# Patient Record
Sex: MALE | Race: WHITE | NOT HISPANIC OR LATINO | URBAN - METROPOLITAN AREA
[De-identification: names, ages, dates, MRNs, and addresses within clinical notes are randomized per-mention and may not be internally consistent; named-entity substitution may affect disease eponyms.]

---
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
## 2020-12-31 PROBLEM — K51.90 ULCERATIVE COLITIS, UNSPECIFIED, WITHOUT COMPLICATIONS: Chronic | Status: ACTIVE | Noted: 2018-08-17

## 2021-01-04 ENCOUNTER — NON-APPOINTMENT (OUTPATIENT)
Age: 21
End: 2021-01-04

## 2021-01-04 ENCOUNTER — APPOINTMENT (OUTPATIENT)
Dept: DERMATOLOGY | Facility: CLINIC | Age: 21
End: 2021-01-04
Payer: COMMERCIAL

## 2021-01-04 VITALS — WEIGHT: 133 LBS | BODY MASS INDEX: 22.16 KG/M2 | HEIGHT: 65 IN

## 2021-01-04 DIAGNOSIS — L30.9 DERMATITIS, UNSPECIFIED: ICD-10-CM

## 2021-01-04 DIAGNOSIS — R23.8 OTHER SKIN CHANGES: ICD-10-CM

## 2021-01-04 PROCEDURE — 99204 OFFICE O/P NEW MOD 45 MIN: CPT | Mod: 25

## 2021-01-04 PROCEDURE — 99072 ADDL SUPL MATRL&STAF TM PHE: CPT

## 2021-01-04 PROCEDURE — 54056 CRYOSURGERY PENIS LESION(S): CPT

## 2021-01-04 RX ORDER — HALOBETASOL PROPIONATE 0.5 MG/G
0.05 OINTMENT TOPICAL
Qty: 1 | Refills: 2 | Status: ACTIVE | COMMUNITY
Start: 2021-01-04 | End: 1900-01-01

## 2021-01-04 RX ORDER — BETAMETHASONE DIPROPIONATE 0.5 MG/G
0.05 OINTMENT, AUGMENTED TOPICAL
Qty: 50 | Refills: 0 | Status: ACTIVE | COMMUNITY
Start: 2020-08-24

## 2021-01-04 RX ORDER — SODIUM PICOSULFATE, MAGNESIUM OXIDE, AND ANHYDROUS CITRIC ACID 10; 3.5; 12 MG/160ML; G/160ML; G/160ML
10-3.5-12 MG-GM LIQUID ORAL
Qty: 320 | Refills: 0 | Status: ACTIVE | COMMUNITY
Start: 2020-12-31

## 2021-01-04 RX ORDER — SODIUM SULFATE, POTASSIUM SULFATE, MAGNESIUM SULFATE 17.5; 3.13; 1.6 G/ML; G/ML; G/ML
17.5-3.13-1.6 SOLUTION, CONCENTRATE ORAL
Qty: 354 | Refills: 0 | Status: ACTIVE | COMMUNITY
Start: 2020-12-31

## 2021-01-04 RX ORDER — CYANOCOBALAMIN 1000 UG/ML
1000 INJECTION INTRAMUSCULAR; SUBCUTANEOUS
Qty: 1 | Refills: 0 | Status: ACTIVE | COMMUNITY
Start: 2020-12-22

## 2021-01-04 RX ORDER — TRIAMCINOLONE ACETONIDE 1 MG/G
0.1 OINTMENT TOPICAL
Qty: 1 | Refills: 1 | Status: ACTIVE | COMMUNITY
Start: 2021-01-04 | End: 1900-01-01

## 2021-02-08 ENCOUNTER — APPOINTMENT (OUTPATIENT)
Dept: DERMATOLOGY | Facility: CLINIC | Age: 21
End: 2021-02-08

## 2021-04-19 NOTE — ED PROVIDER NOTE - CONDUCTED A DETAILED DISCUSSION WITH PATIENT AND/OR GUARDIAN REGARDING, MDM
Physician Progress Note      Johnnie Lemus  CSN #:                  [id number]  :                       1953  ADMIT DATE:       2021 12:18 PM  [address] [place] DATE:  RESPONDING  PROVIDER #:        Gary Garcia MD          QUERY TEXT:    Patient admitted with Common bile duct stone with obstruction noted to have   2/4 SIRS Tmax 39.5, WBC 12.5. If possible, please document in progress notes   and discharge summary if you are evaluating and/or treating any of the   following: The medical record reflects the following:  Risk Factors: CBD obstruction  Clinical Indicators: CT scan at outlying facility: CT of the abdomen done at   [facility] showed evidence of high-grade obstruction of the right distal   common bile duct which could be related to stone diabetes or blood clot, there   was moderate intrahepatic biliary dilatation and dilatation of pancreatic   duct. elevated liver enzymes: Alk phosphate 188, ,   Treatment: GI evaluation, plan for ERCP, NPO, IVF  Options provided:  -- SIRS of non-infectious origin due to common bile duct stone with   obstruction without acute organ dysfunction  -- Other - I will add my own diagnosis  -- Disagree - Not applicable / Not valid  -- Disagree - Clinically unable to determine / Unknown  -- Refer to Clinical Documentation Reviewer    PROVIDER RESPONSE TEXT:    This patient has SIRS of non-infectious origin due to CBD stone with   obstruction without acute organ dysfunction.     Query created by: Michelle Luis on 2021 3:20 PM      Electronically signed by:  Gary Garcia MD 2021 5:01 PM return to ED if symptoms worsen, persist or questions arise/need for outpatient follow-up/lab results

## 2021-07-17 NOTE — PATIENT PROFILE ADULT. - TEACHING/LEARNING CULTURAL CONSIDERATIONS
Nikolas was seen today for follow-up.    Diagnoses and all orders for this visit:    ADD (attention deficit disorder) without hyperactivity  -     lisdexamfetamine (Vyvanse) 30 MG capsule; Take 1 capsule by mouth every morning. Do not start before 2021.  -     lisdexamfetamine (Vyvanse) 30 MG capsule; Take 1 capsule by mouth every morning. Do not start before 2021.  -     lisdexamfetamine (Vyvanse) 30 MG capsule; Take 1 capsule by mouth every morning.    Obesity (BMI 30-39.9)    Stress and adjustment reaction              Established Office Visit     Patient:  Nikolas Lee  :1991    Chief Complaint:   Chief Complaint   Patient presents with   • Follow-up       History Of Present Illness:  Nikolas is at 30 year old male   HPI  Defenders come in today for follow-up for his ADD not been fair for couple months and is here for refill of medications.  We have talked about the ADD contract and for that to be filled out.    He denies any chest pain palpitations shortness of breath bad headaches feels blood pressure is fairly well controlled.    He does feel much better with being on this medication as it makes him more productive make some more focused and he feels better overall.    He is also listening to a new type of music that seems to make him better focus and less anxious that he uses at work and has been accepted by his work.  New Medical Concerns:  Problem List Items Addressed This Visit     ADD (attention deficit disorder) without hyperactivity - Primary    Relevant Medications    lisdexamfetamine (Vyvanse) 30 MG capsule (Start on 2021)    lisdexamfetamine (Vyvanse) 30 MG capsule (Start on 2021)    lisdexamfetamine (Vyvanse) 30 MG capsule    Obesity (BMI 30-39.9)    Stress and adjustment reaction             Exercise Goals -    Sleep Hygeine Recomendations -    Last Consultant  -        /      Procedure / Diagnostics - last 12 months     Last Cardiac Diagnostic Testing -      /    Last MRI/CT -     /    Last Procedure -     /        Active Medical History:     ADD - On Vyvanase      Anxiety - much improved since stopping Stratarra      Obesity- no recent changes    Past Medical History:   Diagnosis Date   • ADD (attention deficit disorder) without hyperactivity 2019    Treated with Vyvanase 30 mg q daily   • Obesity        ALLERGIES:   Allergen Reactions   • Penicillins ANAPHYLAXIS   • Cat Dander Other (See Comments)     EYE SWELLING       Current Medications    ATOMOXETINE (STRATTERA) 40 MG CAPSULE    Take 1 capsule by mouth daily.    LISDEXAMFETAMINE (VYVANSE) 30 MG CAPSULE    Take 1 capsule by mouth every morning.    LISDEXAMFETAMINE (VYVANSE) 30 MG CAPSULE    Take 1 capsule by mouth every morning. Do not start before February 4, 2021.    LISDEXAMFETAMINE (VYVANSE) 40 MG CAPSULE    Take 1 capsule by mouth every morning. Do not start before June 1, 2021.    LISDEXAMFETAMINE (VYVANSE) 40 MG CAPSULE    Take 1 capsule by mouth every morning.    LISDEXAMFETAMINE (VYVANSE) 40 MG CAPSULE    Take 1 capsule by mouth every morning. Do not start before May 1, 2021.    LISDEXAMFETAMINE (VYVANSE) 40 MG CAPSULE    Take 1 capsule by mouth every morning.    LISDEXAMFETAMINE (VYVANSE) 50 MG CAPSULE    Take 1 capsule by mouth every morning. Do not start before December 26, 2020.       Social History     Tobacco Use   • Smoking status: Never Smoker   • Smokeless tobacco: Never Used   Substance Use Topics   • Alcohol use: Yes   • Drug use: Never       History reviewed. No pertinent family history.    Review of Systems:  Review of Systems  Constitutional: Negative for fever, chills, change in appetite or fatigue.  Skin: Negative for rash or wounds.  HEENT: Negative for eye drainage, rhinorrhea, ear pain, sore throat or neck pain.  Respiratory: Negative cough, wheezing or shortness of breath.  Cardiovascular: Negative for chest pain, chest pressure, palpitations or diaphoresis.  Gastrointestinal:  Negative for nausea, vomiting, diarrhea, abdominal pain, black or tarry stools.  Genitourinary: Negative for dysuria, urgency, frequency, hematuria or flank pain.  Extremities:  Negative for joint swelling or joint pain.  Muscular Skeletal c/o:  Neurologic:  Negative for change in sensory or motor function.  Negative for headache, change in gait, vertigo, vision or speech. Gait   Endocrine: Negative for heat or cold intolerance, weight loss or gain.  Hematological: Negative for bleeding, bruising or lymphadenopathy.  Psychiatric: Negative for change in affect, anxiety, depression, mentation or sleep disturbance.    Physical Examination:    Vitals:    07/17/21 1057   BP: 134/72   Pulse: 75   Resp: 19   Temp: 97.9 °F (36.6 °C)   TempSrc: Temporal   SpO2: 97%   Weight: 103.1 kg (227 lb 6.4 oz)   Height: 5' 8\" (1.727 m)       EXAM  Constitutional: appears well-developed and well-nourished. Alert and oriented.   Head: Normocephalic and atraumatic.     Cardiovascular: Regular rate and rhythm, normal S1, S2 no S3 or S4. No murmur, rub or gallops  Pulmonary: Effort normal and breath sounds normal. No wheezes, rales or rhonchi     Neurological: grossly normal. Nml strength cranial II-XII intact  Skin: Skin is warm and dry.   Psych: normal mood and affect  Extremities: no significant edema    Ortho Exam     Physical Exam       Labs:  No results found for: HGBA1C  No results found for: CHOLESTEROL  No results found for: HDL  No results found for: TRIGLYCERIDE  No results found for: CALCLDL    ASSESSMENT AND PLAN:  Nikolas was seen today for follow-up.    Diagnoses and all orders for this visit:    ADD (attention deficit disorder) without hyperactivity  -     lisdexamfetamine (Vyvanse) 30 MG capsule; Take 1 capsule by mouth every morning. Do not start before September 17, 2021.  -     lisdexamfetamine (Vyvanse) 30 MG capsule; Take 1 capsule by mouth every morning. Do not start before August 17, 2021.  -     lisdexamfetamine  (Vyvanse) 30 MG capsule; Take 1 capsule by mouth every morning.    Obesity (BMI 30-39.9)    Stress and adjustment reaction    We have talked about the ADD contract which she has filled out the importance for him not to drink or use other alcohol or recreational drugs.    He will be followed every 3 months with medication to be given at that time.  No follow-ups on file.    Manny Myers M.D.  [facility]   none

## 2021-12-21 ENCOUNTER — APPOINTMENT (OUTPATIENT)
Dept: CARDIOLOGY | Facility: CLINIC | Age: 21
End: 2021-12-21

## 2021-12-21 DIAGNOSIS — Z00.00 ENCOUNTER FOR GENERAL ADULT MEDICAL EXAMINATION W/OUT ABNORMAL FINDINGS: ICD-10-CM

## 2022-01-18 NOTE — ED ADULT TRIAGE NOTE - NS ED NOTE TRAVEL COUNTRIES
[FreeTextEntry1] : Patient reports doing well.  However, feeling more fatigue, resting as needed.  She is taking her Anastrozole without complaints.  She will be completing XRT next week.  We discussed d/c instructions and f/u appointments. She denies breast pain, moisturizing routinely.    [place]

## 2022-01-31 ENCOUNTER — APPOINTMENT (OUTPATIENT)
Dept: CARDIOLOGY | Facility: CLINIC | Age: 22
End: 2022-01-31

## 2023-05-30 NOTE — H&P ADULT - PROBLEM/PLAN-3
Nerves: No cranial nerve deficit. Sensory: Sensation is intact. No sensory deficit. Motor: Motor function is intact. No atrophy or abnormal muscle tone. Coordination: Coordination is intact. Coordination normal.      Gait: Gait abnormal.      Deep Tendon Reflexes: Babinski sign absent on the right side. Reflex Scores:       Tricep reflexes are 2+ on the right side and 2+ on the left side. Bicep reflexes are 2+ on the right side and 2+ on the left side. Brachioradialis reflexes are 2+ on the right side and 2+ on the left side. Patellar reflexes are 1+ on the right side and 2+ on the left side. Achilles reflexes are 1+ on the right side and 2+ on the left side. Psychiatric:         Attention and Perception: Attention and perception normal. She is attentive. Mood and Affect: Mood and affect normal. Mood is not anxious or depressed. Affect is not labile, blunt, angry or inappropriate. Speech: Speech normal. She is communicative. Speech is not rapid and pressured, delayed, slurred or tangential.         Behavior: Behavior normal. Behavior is not agitated, slowed, aggressive, withdrawn, hyperactive or combative. Behavior is cooperative. Thought Content: Thought content normal. Thought content is not paranoid or delusional. Thought content does not include homicidal or suicidal ideation. Thought content does not include homicidal or suicidal plan. Cognition and Memory: Cognition and memory normal. Memory is not impaired. She does not exhibit impaired recent memory or impaired remote memory. Judgment: Judgment normal. Judgment is not impulsive or inappropriate. Comments: anxiety depression      JOSE  Patricks test  positive  Yeoman's  or Gaenslen's positive  Kemps  positive       Assessment:     1. Lumbar spondylosis    2. Thoracic spondylosis    3.  Spinal stenosis of lumbar region, unspecified whether neurogenic claudication present
DISPLAY PLAN FREE TEXT

## 2023-10-02 NOTE — ED PROVIDER NOTE - ABDOMINAL TENDER
Prescription Used: 1
Ultrasound Used Text: High frequency ultrasound depth is 1.61 mm, which is 0.38 mm in difference from previous imaging.
Ultrasound Not Used Text: Ultrasound was not performed today due to
Bill For Treatment ([id number])?: Yes
Additional Change To Daily Dosage Administered Mid Treatment?: No
Treatment Documentation: This patient has been treated today with image-guided superficial radiation therapy for non-melanoma skin cancer. Written informed consent has been previously obtained from this patient for this treatment. This consent is documented in the patient's chart. The patient gave verbal consent to continue treatment today. The patient was treated with a specific radiation dose and setup as prescribed by the provider listed on this visit note. A Radiation Therapist performed administration of radiation under the supervision of a provider. The treatment parameters and cumulative dose are indicated above. Prior to administering the radiation, the patient underwent a verification therapeutic radiology simulation-aided field setting defining relevant normal and abnormal target anatomy and acquiring images with separate and distinct diagnostic high-frequency ultrasound to delineate tissues and determine whether to proceed with delivery of therapeutic, in addition to retrieve data necessary to develop an optimal radiation treatment process for the patient. The field placement simulation documents any change seen in overall tumor volume documented in the patient’s record, allows the clinician to indicate any needed changes in the treatment plan and/or prescription, provides diagnostic evaluation as the basis for performing the therapeutic procedure, and clearly identifies the information needed to decide to proceed with the therapeutic procedure. This process includes verification of the treatment port(s) and proper treatment positioning. All treatment ports were photographed within electronic medical records. The patient's lead blocking along with gross tumor volume and margin was confirmed. Considering superficial radiotherapy is clinical in setup, this requires the physician and radiation therapist to clarify the location interest being treated against initial images, ultrasound, pathology, and patient anatomy. Care was taken to ensure lawson treated were geometrically accurate and properly positioned using therapeutic radiology simulation-aided field setting verification per fraction. This process is also utilized to determine if any prescription or setup changes are necessary. These steps are therefore medically necessary to ensure safe and effective administration of radiation. Ongoing therapeutic radiology simulation-aided field setting verification is ordered throughout the course of therapy.\\n\\nA high-frequency ultrasound image was acquired today for a two-dimensional evaluation of the tumor volume, depth, width, breadth, review of prior response to treatment, provide geometric accuracy of field placement, and determine whether to proceed with therapeutic delivery. \\n\\nThe field placement and ultrasound imaging, per fraction, is separate and distinct from the initial simulation and is an important task in providing safe administration of superficial radiation therapy. Physician evaluation of the ultrasound information will be ongoing throughout the course of treatment and is deemed medically necessary to ensure the efficacy of treatment, whether to proceed with therapeutic delivery, and determine any necessary changes. Today's images were evaluated for determination of continuation of treatment with the current plan or with necessary changes as appropriate. Additionally, the use of ultrasound visualization and targeted assessment allows the patient to be able to see their cancer(s) progress, encouraging the patient to complete and maintain compliance through the full course of prescribed radiotherapy. Per Dr. Luque,  continued ultrasound guidance and therapeutic radiology simulation-aided field setting verification per fraction is required for field placement, measurement of tumor depth, tissue evaluation, progress, acute effect monitoring, and determination for therapeutic treatment delivery is appropriate.
Energy (Kv): 100kV
Additional Comments (Add Customization Of Note Here): First IG-SRT treatment given today to a well healed biopsy site.  Patient came in wearing a mask and stated he tested positive for Covid on 9/25/23 - day of simulation.  Patient states he does not have Covid at this time.  Patient had no further concerns or questions at today's treatment session.
Daily Dosage (Cgy): 274.26
Add X Modifier?: HELLER - Unusual Non-Overlapping Service
Calculate Total Cumulative Dose Automatically Or Manually: Manually
right lower quadrant